# Patient Record
Sex: FEMALE | Race: WHITE
[De-identification: names, ages, dates, MRNs, and addresses within clinical notes are randomized per-mention and may not be internally consistent; named-entity substitution may affect disease eponyms.]

---

## 2017-01-04 ENCOUNTER — HOSPITAL ENCOUNTER (OUTPATIENT)
Dept: HOSPITAL 17 - PLAB | Age: 77
End: 2017-01-04
Attending: INTERNAL MEDICINE
Payer: MEDICARE

## 2017-01-04 DIAGNOSIS — R10.32: Primary | ICD-10-CM

## 2017-01-04 LAB
BUN SERPL-MCNC: 14 MG/DL (ref 7–18)
GFR SERPLBLD BASED ON 1.73 SQ M-ARVRAT: 50 ML/MIN (ref 89–?)

## 2017-01-04 PROCEDURE — 84520 ASSAY OF UREA NITROGEN: CPT

## 2017-01-04 PROCEDURE — 36415 COLL VENOUS BLD VENIPUNCTURE: CPT

## 2017-01-04 PROCEDURE — 82565 ASSAY OF CREATININE: CPT

## 2017-06-24 ENCOUNTER — HOSPITAL ENCOUNTER (EMERGENCY)
Dept: HOSPITAL 17 - PHED | Age: 77
Discharge: HOME | End: 2017-06-24
Payer: MEDICARE

## 2017-06-24 VITALS
BODY MASS INDEX: 21.15 KG/M2 | HEART RATE: 70 BPM | SYSTOLIC BLOOD PRESSURE: 162 MMHG | RESPIRATION RATE: 16 BRPM | DIASTOLIC BLOOD PRESSURE: 78 MMHG | TEMPERATURE: 98.2 F | WEIGHT: 123.9 LBS | HEIGHT: 64 IN | OXYGEN SATURATION: 98 %

## 2017-06-24 DIAGNOSIS — I10: ICD-10-CM

## 2017-06-24 DIAGNOSIS — K52.9: Primary | ICD-10-CM

## 2017-06-24 DIAGNOSIS — E78.00: ICD-10-CM

## 2017-06-24 LAB
COLOR UR: YELLOW
COMMENT (UR): (no result)
CULTURE IF INDICATED: (no result)
GLUCOSE UR STRIP-MCNC: (no result) MG/DL
HGB UR QL STRIP: (no result)
KETONES UR STRIP-MCNC: (no result) MG/DL
LEUKOCYTE ESTERASE UR QL STRIP: (no result) /HPF (ref 0–5)
METHOD OF COLLECTION: (no result)
NITRITE UR QL STRIP: (no result)
SP GR UR STRIP: 1.01 (ref 1–1.03)
SQUAMOUS #/AREA URNS HPF: (no result) /HPF (ref 0–5)

## 2017-06-24 PROCEDURE — 81001 URINALYSIS AUTO W/SCOPE: CPT

## 2017-06-24 PROCEDURE — 99284 EMERGENCY DEPT VISIT MOD MDM: CPT

## 2017-06-24 NOTE — PD
HPI


Chief Complaint:  GI Complaint


Time Seen by Provider:  13:44


Travel History


International Travel<30 days:  No


Contact w/Intl Traveler<30days:  No


Traveled to known affect area:  No





History of Present Illness


HPI


77-year-old female here with complaint of abdominal pressure, frequent stools 

and flatus.  Patient has had now 6 days of pressure within the suprapubic and 

left lower quadrant.  Associated frequent stools.  This is not diarrhea, but is 

soft and formed.  No hematochezia.  She notes associated frequent flatus.  

Patient states that she has had several bouts of this over the course the last 

several years most recently January 2017.  She has a history of diverticulosis 

but no diverticulitis.  Patient states that in January 2017 she had a CT scan 

that was negative for diverticulitis in association with this pain.  She was 

diagnosed with possible colitis versus treated with Cipro and Flagyl with 

improvement of her symptoms within 2-3 days.  No fevers, chills, urinary 

symptoms.





PFSH


Past Medical History


Arthritis:  Yes (OSTEO)


Autoimmune Disease:  No


Cancer:  No


Cardiovascular Problems:  Yes (HTN)


High Cholesterol:  Yes


Diabetes:  No


Diminished Hearing:  No


Endocrine:  No


Gastrointestinal Disorders:  Yes (GAS PAIN; PRESSURE )


Glaucoma:  No


Genitourinary:  Yes (CYSTOCELE; BLADDER PROLAPSE REPAIRED)


Hepatitis:  No


Hiatal Hernia:  No


Hypertension:  Yes


Immune Disorder:  No


Implanted Vascular Access Dvce:  No


Medical other:  No


Musculoskeletal:  No


Neurologic:  No


Psychiatric:  No


Reproductive:  Yes (HYSTERECTOMY)


Respiratory:  Yes (HX PNEUMONIA YEARS AGO)


Thyroid Disease:  No


Tetanus Vaccination:  Unknown


Pregnant?:  Not Pregnant


Menopausal:  Yes


Tubal Ligation:  Yes





Past Surgical History


Abdominal Surgery:  Yes (LEA.)


AICD:  No


Body Medical Devices:  PT WEARS A PESSARY


Cardiac Surgery:  No


Cholecystectomy:  Yes


Ear Surgery:  No


Endocrine Surgery:  No


Eye Surgery:  No


Genitourinary Surgery:  Yes (INGUINAL HERNIA LEFT X 2, FEMORAL HERNIA)


Gynecologic Surgery:  Yes (TUBAL LIGATION, TOTAL HYSTERECTOMY WITH A AND P 

REPAIR)


Hysterectomy:  Yes


Joint Replacement:  No


Neurologic Surgery:  No


Oral Surgery:  Yes (T&A)


Pacemaker:  No


Thoracic Surgery:  Yes (LENIN BREAST BX X 3 ALL BENIGN)


Tonsillectomy:  Yes


Other Surgery:  Yes (BREAST BIOPSY X 3)





Social History


Alcohol Use:  No


Tobacco Use:  Yes (VAPOR)


Substance Use:  No





Allergies-Medications


(Allergen,Severity, Reaction):  


Coded Allergies:  


     Lisinopril (Unverified  Adverse Reaction, Severe, GI upset, 6/24/17)


Reported Meds & Prescriptions





Reported Meds & Active Scripts


Active


Flagyl (Metronidazole) 500 Mg Tab 500 Mg PO TID 7 Days


Cipro (Ciprofloxacin HCl) 250 Mg Tab 750 Mg PO BID 7 Days








Review of Systems


Except as stated in HPI:  all other systems reviewed are Neg





Physical Exam


Narrative


GENERAL: Well-appearing female in no acute distress


SKIN: Focused skin assessment warm/dry.


HEAD: Normocephalic. 


EYES:  No scleral icterus. No injection or drainage. 


ENT:  Mucous membranes pink and moist.


NECK: Supple


CARDIOVASCULAR: Regular rate and rhythm. 


RESPIRATORY: No accessory muscle use. 


GASTROINTESTINAL: Abdomen soft, minimal suprapubic and left lower quadrant 

tenderness to palpation without rebound or guarding


MUSCULOSKELETAL: No obvious deformities. No edema. 


NEUROLOGICAL: Awake and alert. Normal speech.


PSYCHIATRIC: Appropriate mood and affect; insight and judgment normal.





Data


Data


Last Documented VS





Vital Signs








  Date Time  Temp Pulse Resp B/P Pulse Ox O2 Delivery O2 Flow Rate FiO2


 


6/24/17 12:25 98.2 70 16 162/78 98   








Orders





 Urinalysis - C+S If Indicated (6/24/17 13:42)








MDM


Medical Decision Making


Medical Screen Exam Complete:  Yes


Emergency Medical Condition:  Yes


Medical Record Reviewed:  Yes


Differential Diagnosis


77-year-old female with history of 6 days of suprapubic and left lower quadrant 

pressure, frequent stools and flatus.  History of similar episodes periodically 

over the last several years.  Symptoms sound consistent with colitis or 

diverticulitis though patient has had negative CT scans in association with 

this in the past.  Differential includes inflammatory bowel disease, irritable 

bowel disease and less likely peritoneal pathology given her overall benign 

abdominal examination.


Narrative Course


Patient was offered laboratory testing and CT imaging but declines, states that 

she's had similar episodes in the past and requests simply treatment.  Given 

her history and benign abdominal examination, I think a course of Cipro and 

Flagyl is a reasonable option.  Patient was given prescriptions for these and 

discharged home with return instructions.





Diagnosis





 Primary Impression:  


 Colitis


Referrals:  


Primary Care Physician


as needed





***Additional Instructions:


Antibiotics as prescribed.  Follow-up with primary care and/or GI physician is 

symptoms persist and return to the ER for the warning signs discussed.


***Med/Other Pt SpecificInfo:  Prescription(s) given


Scripts


Metronidazole (Flagyl)500 Mg [id number] Mg PO TID  7 Days  Ref 0


   Prov:Lena Cruz MD         6/24/17 


Ciprofloxacin (Cipro)250 Mg [id number] Mg PO BID  7 Days  Ref 0


   Prov:Lena Cruz MD         6/24/17


Disposition:  01 DISCHARGE HOME


Condition:  Stable








Lena Cruz MD Jun 24, 2017 13:50

## 2017-08-25 ENCOUNTER — HOSPITAL ENCOUNTER (OUTPATIENT)
Dept: HOSPITAL 17 - PLAB | Age: 77
End: 2017-08-25
Payer: MEDICARE

## 2017-08-25 DIAGNOSIS — N18.3: ICD-10-CM

## 2017-08-25 DIAGNOSIS — I12.9: Primary | ICD-10-CM

## 2017-08-25 DIAGNOSIS — E78.00: ICD-10-CM

## 2017-08-25 LAB
ALP SERPL-CCNC: 65 U/L (ref 45–117)
ALT SERPL-CCNC: 21 U/L (ref 10–53)
ANION GAP SERPL CALC-SCNC: 7 MEQ/L (ref 5–15)
AST SERPL-CCNC: 18 U/L (ref 15–37)
BILIRUB SERPL-MCNC: 0.6 MG/DL (ref 0.2–1)
BUN SERPL-MCNC: 16 MG/DL (ref 7–18)
CHLORIDE SERPL-SCNC: 104 MEQ/L (ref 98–107)
ERYTHROCYTE [DISTWIDTH] IN BLOOD BY AUTOMATED COUNT: 14.9 % (ref 11.6–17.2)
GFR SERPLBLD BASED ON 1.73 SQ M-ARVRAT: 49 ML/MIN (ref 89–?)
HCO3 BLD-SCNC: 25.8 MEQ/L (ref 21–32)
HCT VFR BLD CALC: 41 % (ref 35–46)
HDLC SERPL-MCNC: 44.3 MG/DL (ref 40–60)
LDLC SERPL-MCNC: 156 MG/DL (ref 0–99)
MCH RBC QN AUTO: 30.2 PG (ref 27–34)
MCHC RBC AUTO-ENTMCNC: 33.4 % (ref 32–36)
MCV RBC AUTO: 90.6 FL (ref 80–100)
PLATELET # BLD: 355 TH/MM3 (ref 150–450)
POTASSIUM SERPL-SCNC: 4.6 MEQ/L (ref 3.5–5.1)
RBC # BLD AUTO: 4.52 MIL/MM3 (ref 4–5.3)
REVIEW FLAG: (no result)
SODIUM SERPL-SCNC: 137 MEQ/L (ref 136–145)
WBC # BLD AUTO: 9.3 TH/MM3 (ref 4–11)

## 2017-08-25 PROCEDURE — 84443 ASSAY THYROID STIM HORMONE: CPT

## 2017-08-25 PROCEDURE — 80053 COMPREHEN METABOLIC PANEL: CPT

## 2017-08-25 PROCEDURE — 36415 COLL VENOUS BLD VENIPUNCTURE: CPT

## 2017-08-25 PROCEDURE — 85027 COMPLETE CBC AUTOMATED: CPT

## 2017-08-25 PROCEDURE — 80061 LIPID PANEL: CPT

## 2018-06-01 ENCOUNTER — HOSPITAL ENCOUNTER (OUTPATIENT)
Dept: HOSPITAL 17 - PLAB | Age: 78
End: 2018-06-01
Payer: MEDICARE

## 2018-06-01 DIAGNOSIS — E78.00: ICD-10-CM

## 2018-06-01 DIAGNOSIS — E78.5: ICD-10-CM

## 2018-06-01 DIAGNOSIS — N18.3: ICD-10-CM

## 2018-06-01 DIAGNOSIS — I12.9: Primary | ICD-10-CM

## 2018-06-01 LAB
ALBUMIN SERPL-MCNC: 3.5 GM/DL (ref 3.4–5)
ALP SERPL-CCNC: 69 U/L (ref 45–117)
ALT SERPL-CCNC: 25 U/L (ref 10–53)
AST SERPL-CCNC: 21 U/L (ref 15–37)
BILIRUB SERPL-MCNC: 0.4 MG/DL (ref 0.2–1)
BUN SERPL-MCNC: 24 MG/DL (ref 7–18)
CALCIUM SERPL-MCNC: 9.1 MG/DL (ref 8.5–10.1)
CHLORIDE SERPL-SCNC: 105 MEQ/L (ref 98–107)
CHOLEST SERPL-MCNC: 232 MG/DL (ref 120–200)
CHOLESTEROL/ HDL RATIO: 5.04 RATIO
CREAT SERPL-MCNC: 1.33 MG/DL (ref 0.5–1)
ERYTHROCYTE [DISTWIDTH] IN BLOOD BY AUTOMATED COUNT: 14.3 % (ref 11.6–17.2)
GFR SERPLBLD BASED ON 1.73 SQ M-ARVRAT: 39 ML/MIN (ref 89–?)
HCO3 BLD-SCNC: 24.9 MEQ/L (ref 21–32)
HCT VFR BLD CALC: 43 % (ref 35–46)
HDLC SERPL-MCNC: 46 MG/DL (ref 40–60)
HGB BLD-MCNC: 14.5 GM/DL (ref 11.6–15.3)
LDLC SERPL-MCNC: 168 MG/DL (ref 0–99)
MCH RBC QN AUTO: 29.6 PG (ref 27–34)
MCHC RBC AUTO-ENTMCNC: 33.8 % (ref 32–36)
MCV RBC AUTO: 87.7 FL (ref 80–100)
PLATELET # BLD: 414 TH/MM3 (ref 150–450)
PMV BLD AUTO: 7.4 FL (ref 7–11)
PROT SERPL-MCNC: 7.3 GM/DL (ref 6.4–8.2)
RBC # BLD AUTO: 4.9 MIL/MM3 (ref 4–5.3)
SODIUM SERPL-SCNC: 139 MEQ/L (ref 136–145)
TRIGL SERPL-MCNC: 91 MG/DL (ref 42–150)
WBC # BLD AUTO: 8.6 TH/MM3 (ref 4–11)

## 2018-06-01 PROCEDURE — 84443 ASSAY THYROID STIM HORMONE: CPT

## 2018-06-01 PROCEDURE — 36415 COLL VENOUS BLD VENIPUNCTURE: CPT

## 2018-06-01 PROCEDURE — 80053 COMPREHEN METABOLIC PANEL: CPT

## 2018-06-01 PROCEDURE — 85027 COMPLETE CBC AUTOMATED: CPT

## 2018-06-01 PROCEDURE — 80061 LIPID PANEL: CPT
